# Patient Record
Sex: MALE | Race: WHITE | NOT HISPANIC OR LATINO | URBAN - METROPOLITAN AREA
[De-identification: names, ages, dates, MRNs, and addresses within clinical notes are randomized per-mention and may not be internally consistent; named-entity substitution may affect disease eponyms.]

---
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
## 2022-02-12 VITALS
WEIGHT: 194.2 LBS | WEIGHT: 193.4 LBS | TEMPERATURE: 98.6 F | HEART RATE: 65 BPM | DIASTOLIC BLOOD PRESSURE: 73 MMHG | TEMPERATURE: 99.2 F | BODY MASS INDEX: 27.19 KG/M2 | HEIGHT: 71 IN | SYSTOLIC BLOOD PRESSURE: 120 MMHG | DIASTOLIC BLOOD PRESSURE: 69 MMHG | HEART RATE: 69 BPM | SYSTOLIC BLOOD PRESSURE: 122 MMHG

## 2022-02-12 VITALS
HEIGHT: 74 IN | TEMPERATURE: 98.5 F | BODY MASS INDEX: 25.46 KG/M2 | HEART RATE: 66 BPM | DIASTOLIC BLOOD PRESSURE: 69 MMHG | WEIGHT: 198.4 LBS | SYSTOLIC BLOOD PRESSURE: 133 MMHG

## 2022-02-12 VITALS
SYSTOLIC BLOOD PRESSURE: 131 MMHG | WEIGHT: 207.6 LBS | HEART RATE: 76 BPM | DIASTOLIC BLOOD PRESSURE: 75 MMHG | TEMPERATURE: 98.6 F

## 2022-02-16 NOTE — PROGRESS NOTES
Patient:   NAPOLEON LEIGH            MRN: [id number]            FIN: [id number]               Age:   19 years     Sex:  Male     :  2000   Associated Diagnoses:   Sports physical   Author:   Sharad Hdz MD      Visit Information      Date of Service: 2019 02:54 pm  Performing Location: [facility]  Encounter#: [id number]      Primary Care Provider (PCP):  Sharad Hdz MD    NPI# [id number]   Visit type:  Annual exam.    Accompanied by   Source of history      Chief Complaint   2019 3:00 PM CST   Sports px     Adolescent Physical  SEE SCANNED PATIENT HISTORY FORM      Well Child History   Well Child History   Academics/ activities.     Socialization interacting well with family/ relatives and interacting well with peers/ friends.     Bathing daily baths.     Diet/ Feeding balanced.     Sleeping good sleeper.     No parental concerns/ questions.        Review of Systems   Constitutional:  Negative.    Eye:  No visual disturbances.    Ear/Nose/Mouth/Throat:  No decreased hearing.    Respiratory:  Negative.    Cardiovascular:  Negative.    Gastrointestinal:  Negative.    Genitourinary:  Negative.    Hematology/Lymphatics:  No bruising tendency, No bleeding tendency.    Endocrine:  Negative.    Musculoskeletal:  No joint pain, No muscle pain, No trauma.    Integumentary:  Negative.    Neurologic:  Alert and oriented X4, No abnormal balance, No headache.    Psychiatric:  No anxiety, No depression.       Health Status   Allergies:    Allergic Reactions (Selected)  Severity Not Documented  Augmentin (Rash)   Problem list:    All Problems  Anxiety / ICD-9-.00 / Confirmed  Eustachian Tube Dysfunction / ICD-9-.81 / Confirmed  Gastroesophageal Reflux / ICD-9-.81 / Confirmed  Inactive: Bronchitis / ICD-9-  Inactive: Conjunctivitis / ICD-9-.30  Inactive: Eczema / ICD-9-.9  Inactive: Herpangina  Inactive: Otitis / ICD-9-.9  Inactive: Pharyngitis  / ICD-9-  Inactive: Pneumonia / ICD-9-  Inactive: Poison Ivy / ICD-9-.6  Inactive: Rash, Skin / ICD-9-.1  Inactive: Recurrent Upper Respiratory Infection (URI) / ICD-9-.8  Inactive: Sinusitis  Inactive: Subconjunctival hemorrhage / ICD-9-.72   Medications:  (Selected)   ,    Medications          No Known Home Medications        Histories   Past Medical History:    Active  Gastroesophageal Reflux (530.81): Onset in the month of 9/2009 at 9 years  Anxiety (300.00): Onset in the month of 9/2009 at 9 years  Eustachian Tube Dysfunction (381.81): Onset in the month of 3/2006 at 5 years  Comments:  5/24/2011 CDT 10:33 AM CDT - Ioana Toribio.   Family History:    No family history items have been selected or recorded.   Procedure history:    None (SNOMED CT 122534620).   Social History:        Tobacco Assessment            Never smoker, Household tobacco concerns: No.  ,        Tobacco Assessment            Never smoker, Household tobacco concerns: No.        Physical Examination   Vital Signs   11/12/2019 3:00 PM CST Temperature Tympanic 98.5 DegF    Peripheral Pulse Rate 66 bpm    HR Method Electronic    Systolic Blood Pressure 133 mmHg    Diastolic Blood Pressure 69 mmHg    Mean Arterial Pressure 90 mmHg    BP Method Electronic      Measurements from flowsheet : Measurements   11/12/2019 3:00 PM CST Height Measured - Standard 73.5 in    Weight Measured - Standard 198.4 lb    BSA 2.16 m2    Body Mass Index 25.82 kg/m2    Body Mass Index Percentile 88.18      General:  Alert and oriented.    Eye:  Pupils are equal, round and reactive to light, Extraocular movements are intact, Normal conjunctiva.    HENT:  Tympanic membranes are clear, Normal hearing, Oral mucosa is moist.    Neck:  Supple, Non-tender, No lymphadenopathy, No thyromegaly.    Respiratory:  Lungs are clear to auscultation.    Cardiovascular:  Normal rate, Regular rhythm, No murmur, Good pulses equal in all  extremities.    Gastrointestinal:  Soft, Non-tender, Non-distended, No organomegaly.    Musculoskeletal:  No scoliosis, back and neck normal, Normal gait, normal hips, thighs,knees, legs, ankles and feet; normal duck walk, Upper and lower extremity strength normal and equal bilaterally, Normal shoulders, arms, elbow, forearms, wrists and hands.    Integumentary:  Intact.    Neurologic:  Alert, Oriented.    Psychiatric:  Cooperative, Appropriate mood & affect, Normal judgment.       Health Maintenance   14 - 17 years:   Risks/ Toxic exposure: smoking/ tobacco use, sexual activity, substance abuse (alcohol, drugs).   Counseling/ Guidance: nutrition balanced diet, exercise regular physical activity/ exercise, social behavior/ parenting (cognitive skills, discipline, peer relationships, puberty/ sex education, social, substance abuse education), injury prevention (auto/ airbags/ seat belts, helmet for biking/ skating/ ATV/ motorcycle).         Recommendations     Pending (in the next year)        Due            Well Child 2 yrs - 18 yrs due  11/12/19  and every 1  year(s)     Satisfied (in the past 1 year)        Satisfied            Body Mass Index Check (Male) on  11/12/19.          Impression and Plan   Diagnosis     Sports physical (INY66-HZ Z02.5).     Course:  Cleared for sports participation without restrictions.    Anticipatory Guidance:       Adolescence (11 - 21 years): Hobbies, Peer relations, School performance, Television, Substance abuse, Sexual identity/ dating, Seatbelts/ airbags, Depression/ anxiety, Alcohol/ drugs/ smoking, Nutrition/ oral health.    Counseled:  Patient, Family.

## 2022-02-16 NOTE — PROGRESS NOTES
Patient:   NAPOLEON LEIGH            MRN: [id number]            FIN: [id number]               Age:   17 years     Sex:  Male     :  2000   Associated Diagnoses:   Cold   Author:   Sharad Hdz MD      Visit Information      Primary Care Provider (PCP):  Sharad Hdz MD    NPI# [id number]      Chief Complaint   10/11/2017 2:01 PM CDT   Cough, congestion, chills x5 days.     Upper Respiratory Symptoms      History of Present Illness             The patient presents with symptoms of an upper respiratory infection.  The symptoms of the upper respiratory infection are described as rhinorrhea, sore throat, nasal congestion, cough and no sputum production.  The severity of the symptoms associated to the upper respiratory infection is moderate.  The symptoms of upper respiratory infection have lasted for 5 day(s).  Exacerbating factors consist of none.  Relieving factors consist of none.  Associated symptoms consist of none.        Review of Systems   Constitutional:  Fatigue.    Ear/Nose/Mouth/Throat:  Nasal congestion.    Respiratory:  Cough, No shortness of breath, No sputum production, No wheezing.    Integumentary:  No rash.             Health Status   Allergies:    Allergic Reactions (Selected)  Severity Not Documented  Augmentin (Rash)   Medications:  (Selected)   Prescriptions  Prescribed  Flonase 50 mcg/inh nasal spray: 2 spray(s), Nasal, Daily, # 1 EA, 6 Refill(s), Type: Maintenance, Pharmacy: [id number], 2 spray(s) nasal daily   Problem list:    All Problems  Anxiety / ICD-9-.00 / Confirmed  Eustachian Tube Dysfunction / ICD-9-.81 / Confirmed  Gastroesophageal Reflux / ICD-9-.81 / Confirmed  Inactive: Bronchitis / ICD-9-  Inactive: Conjunctivitis / ICD-9-.30  Inactive: Eczema / ICD-9-.9  Inactive: Herpangina  Inactive: Otitis / ICD-9-.9  Inactive: Pharyngitis / ICD-9-  Inactive: Pneumonia / ICD-9-  Inactive: Poison Ivy / ICD-9-CM  692.6  Inactive: Rash, Skin / ICD-9-.1  Inactive: Recurrent Upper Respiratory Infection (URI) / ICD-9-.8  Inactive: Sinusitis  Inactive: Subconjunctival hemorrhage / ICD-9-.72      Histories   Past Medical History:    Active  Gastroesophageal Reflux (530.81): Onset in the month of 9/2009 at 9 years  Anxiety (300.00): Onset in the month of 9/2009 at 9 years  Eustachian Tube Dysfunction (381.81): Onset in the month of 3/2006 at 5 years  Comments:  5/24/2011 CDT 10:33 AM CDT - Catarino , Ioana  Bilateral.   Family History:    No family history items have been selected or recorded.   Procedure history:    None (SNOMED CT 140846048).   Social History:        Tobacco Assessment            Never smoker, Household tobacco concerns: No.  ,        Tobacco Assessment            Never smoker, Household tobacco concerns: No.        Physical Examination   Vital Signs   10/11/2017 2:01 PM CDT Temperature Tympanic 99.2 DegF    Peripheral Pulse Rate 69 bpm    HR Method Electronic    Systolic Blood Pressure 122 mmHg    Diastolic Blood Pressure 73 mmHg    Mean Arterial Pressure 89 mmHg    BP Method Electronic      Measurements from flowsheet : Measurements   10/11/2017 2:01 PM CDT   Weight Measured - Standard                193.4 lb     General:  Alert and oriented, No acute distress.    Eye:  Normal conjunctiva.    HENT:  Normocephalic, Tympanic membranes are clear, Oral mucosa is moist.         Nose: Both nostrils, Discharge ( Small amount, Clear ), marked mucosal swelling questionable poylyp right.    Neck:  Supple, Non-tender, No lymphadenopathy.    Respiratory:  Lungs are clear to auscultation, Breath sounds are equal, Symmetrical chest wall expansion.         Respirations: Are within normal limits.         Pattern: Regular.         Breath sounds: Bilateral, Within normal limits.    Cardiovascular:  Normal rate, Regular rhythm, No murmur, Good pulses equal in all extremities, Normal peripheral perfusion, No edema.     Gastrointestinal:  Soft, Non-tender.    Integumentary:  Warm, No rash.    Neurologic:  Alert, Oriented.    Psychiatric:  Cooperative, Appropriate mood & affect.       Review / Management   Course:  Progressing as expected.       Impression and Plan   Diagnosis     Cold (BIF42-MI J00).     Course:  Progressing as expected.    Orders     Return to clinic or ER if no improvements or worsening signs symptoms.     Symptomatic care guidelines reviewed.     flonase for a few weeks fu 1 week if not better sooner if worse.     Counseled:  Regarding diagnosis (Reviewed the viral nature of this illness and recommended rest and fluids. Discussed the natural history of viral URI, anticipatory guidance).    Patient Instructions:  Launch follow-up (if licensed).

## 2022-02-16 NOTE — PROGRESS NOTES
Patient:   NAPOLEON LEGIH            MRN: [id number]            FIN: [id number]               Age:   17 years     Sex:  Male     :  2000   Associated Diagnoses:   Sports physical   Author:   Sharad Hdz MD      Visit Information   Visit type:  Annual exam.    Accompanied by   Source of history      Chief Complaint   2017 4:03 PM CST    Sports px.     Adolescent Physical  SEE SCANNED PATIENT HISTORY FORM      Well Child History   Well Child History   Academics/ activities.     Socialization interacting well with family/ relatives and interacting well with peers/ friends.     Bathing daily baths.     Diet/ Feeding balanced.     Sleeping good sleeper.     No parental concerns/ questions.        Review of Systems   Constitutional:  Negative.    Eye:  No visual disturbances.    Ear/Nose/Mouth/Throat:  No decreased hearing.    Respiratory:  Negative.    Cardiovascular:  Negative.    Gastrointestinal:  Negative.    Genitourinary:  Negative.    Hematology/Lymphatics:  No bruising tendency, No bleeding tendency.    Endocrine:  Negative.    Musculoskeletal:  No joint pain, No muscle pain, No trauma.    Integumentary:  Negative.    Neurologic:  Alert and oriented X4, No abnormal balance, No headache.    Psychiatric:  No anxiety, No depression.       Health Status   Allergies:    Allergic Reactions (Selected)  Severity Not Documented  Augmentin (Rash)   Problem list:    All Problems  Anxiety / ICD-9-.00 / Confirmed  Eustachian Tube Dysfunction / ICD-9-.81 / Confirmed  Gastroesophageal Reflux / ICD-9-.81 / Confirmed  Inactive: Bronchitis / ICD-9-  Inactive: Conjunctivitis / ICD-9-.30  Inactive: Eczema / ICD-9-.9  Inactive: Herpangina  Inactive: Otitis / ICD-9-.9  Inactive: Pharyngitis / ICD-9-  Inactive: Pneumonia / ICD-9-  Inactive: Poison Ivy / ICD-9-.6  Inactive: Rash, Skin / ICD-9-.1  Inactive: Recurrent Upper Respiratory Infection (URI) /  ICD-9-.8  Inactive: Sinusitis  Inactive: Subconjunctival hemorrhage / ICD-9-.72   Medications:  (Selected)   Prescriptions  Prescribed  Flonase 50 mcg/inh nasal spray: 2 spray(s), Nasal, Daily, # 1 EA, 6 Refill(s), Type: Maintenance, Pharmacy: [facility] #[id number], 2 spray(s) nasal daily      Histories   Past Medical History:    Active  Gastroesophageal Reflux (530.81): Onset in the month of 9/2009 at 9 years  Anxiety (300.00): Onset in the month of 9/2009 at 9 years  Eustachian Tube Dysfunction (381.81): Onset in the month of 3/2006 at 5 years  Comments:  5/24/2011 CDT 10:33 AM CDT - Ioana Toribio.   Family History:    No family history items have been selected or recorded.   Procedure history:    None (SNOMED CT 738161920).   Social History:        Tobacco Assessment            Never smoker, Household tobacco concerns: No.  ,        Tobacco Assessment            Never smoker, Household tobacco concerns: No.        Physical Examination   Vital Signs   11/8/2017 4:03 PM CST Temperature Tympanic 98.6 DegF    Peripheral Pulse Rate 65 bpm    HR Method Electronic    Systolic Blood Pressure 120 mmHg    Diastolic Blood Pressure 69 mmHg    Mean Arterial Pressure 86 mmHg    BP Method Electronic      Measurements from flowsheet : Measurements   11/8/2017 4:03 PM CST Height Measured - Standard 71 in    Weight Measured - Standard 194.2 lb    BSA 2.1 m2    Body Mass Index 27.08 kg/m2    Body Mass Index Percentile 95.08      General:  Alert and oriented.    Eye:  Pupils are equal, round and reactive to light, Extraocular movements are intact, Normal conjunctiva.    HENT:  Tympanic membranes are clear, Normal hearing, Oral mucosa is moist.    Neck:  Supple, Non-tender, No lymphadenopathy, No thyromegaly.    Respiratory:  Lungs are clear to auscultation.    Cardiovascular:  Normal rate, Regular rhythm, No murmur, Good pulses equal in all extremities.    Gastrointestinal:  Soft, Non-tender, Non-distended, No  organomegaly.    Musculoskeletal:  No scoliosis, back and neck normal, Normal gait, normal hips, thighs,knees, legs, ankles and feet; normal duck walk, Upper and lower extremity strength normal and equal bilaterally, Normal shoulders, arms, elbow, forearms, wrists and hands.    Integumentary:  Intact.    Neurologic:  Alert, Oriented.    Psychiatric:  Cooperative, Appropriate mood & affect, Normal judgment.       Health Maintenance   14 - 17 years:   Risks/ Toxic exposure: smoking/ tobacco use, sexual activity, substance abuse (alcohol, drugs).   Counseling/ Guidance: nutrition balanced diet, exercise regular physical activity/ exercise, social behavior/ parenting (cognitive skills, discipline, peer relationships, puberty/ sex education, social, substance abuse education), injury prevention (auto/ airbags/ seat belts, helmet for biking/ skating/ ATV/ motorcycle).         Impression and Plan   Diagnosis     Sports physical (EPO19-SH Z02.5).     Course:  Cleared for sports participation without restrictions.    Anticipatory Guidance:       Adolescence (11 - 21 years): Hobbies, Peer relations, School performance, Television, Substance abuse, Sexual identity/ dating, Seatbelts/ airbags, Depression/ anxiety, Alcohol/ drugs/ smoking, Nutrition/ oral health.    Counseled:  Patient, Family.

## 2022-02-16 NOTE — LETTER
(Inserted Image. Unable to display)     November 13, 2020      NAPOLEON LEIGH  [address]   [place], [place] [id number]          Dear NAPOLEON,      Thank you for selecting [facility] (previously [facility]) for your healthcare needs.    Our records indicate you are due for the following services:     Annual Physical.    (FYI   Regarding office visits: In some instances, a video visit or telephone visit may be offered as an option.)      To schedule an appointment or if you have further questions, please contact your primary clinic:   [facility]       [phone]   [facility]       [phone]              [facility]     [phone]      Powered by [employer]    Sincerely,    Sharad Hdz MD

## 2022-02-16 NOTE — NURSING NOTE
Comprehensive Intake Entered On:  11/12/2019 3:02 PM CST    Performed On:  11/12/2019 3:00 PM CST by Corazon Gurrola               Summary   Chief Complaint :   Sports px   Menstrual Status :   N/A   Weight Measured :   198.4 lb(Converted to: 198 lb 6 oz, 89.99 kg)    Height Measured :   73.5 in(Converted to: 6 ft 1 in, 186.69 cm)    Body Mass Index :   25.82 kg/m2   Body Surface Area :   2.16 m2   Systolic Blood Pressure :   133 mmHg   Diastolic Blood Pressure :   69 mmHg   Mean Arterial Pressure :   90 mmHg   Peripheral Pulse Rate :   66 bpm   BP Method :   Electronic   HR Method :   Electronic   Temperature Tympanic :   98.5 DegF(Converted to: 36.9 DegC)    Corazon Gurrola - 11/12/2019 3:00 PM CST   Health Status   Allergies Verified? :   Yes   Medication History Verified? :   Yes   Pre-Visit Planning Status :   Completed   Tobacco Use? :   Never smoker   Corazon Gurrola - 11/12/2019 3:00 PM CST   Meds / Allergies   (As Of: 11/12/2019 3:02:43 PM CST)   Allergies (Active)   Augmentin  Estimated Onset Date:   Unspecified ; Reactions:   rash ; Created By:   Deborah Alexandra; Reaction Status:   Active ; Category:   Drug ; Substance:   Augmentin ; Type:   Allergy ; Updated By:   Deborah Alexandra; Reviewed Date:   11/5/2014 7:25 PM CST        Medication List   (As Of: 11/12/2019 3:02:43 PM CST)   No Known Home Medications     Corazon Gurrola - 11/12/2019 3:00:49 PM      Prescription/Discharge Order    fluticasone nasal  :   fluticasone nasal ; Status:   Completed ; Ordered As Mnemonic:   Flonase 50 mcg/inh nasal spray ; Simple Display Line:   2 spray(s), Nasal, Daily, 1 EA, 6 Refill(s) ; Ordering Provider:   Sharad Hdz MD; Catalog Code:   fluticasone nasal ; Order Dt/Tm:   6/4/2019 6:31:08 PM CDT            Vision Testing POC   Corrective Lenses :   None   Eye, Left Visual Acuity :   20/15   Eye, Right Visual Acuity :   20/15   Eye, Bilateral Visual Acuity :   20/15   Corazon Gurrola  - 11/12/2019 3:03 PM CST

## 2022-02-16 NOTE — NURSING NOTE
Comprehensive Intake Entered On:  6/4/2019 6:19 PM CDT    Performed On:  6/4/2019 6:17 PM CDT by Corazon Gurrola               Summary   Chief Complaint :   Nasal drainage, congestion, sore throat.  Started yesterday.    Menstrual Status :   N/A   Weight Measured :   207.6 lb(Converted to: 207 lb 10 oz, 94.17 kg)    Systolic Blood Pressure :   131 mmHg   Diastolic Blood Pressure :   75 mmHg   Mean Arterial Pressure :   94 mmHg   Peripheral Pulse Rate :   76 bpm   BP Method :   Electronic   HR Method :   Electronic   Temperature Tympanic :   98.6 DegF(Converted to: 37.0 DegC)    Corazon Gurrola - 6/4/2019 6:17 PM CDT   Health Status   Allergies Verified? :   Yes   Medication History Verified? :   Yes   Tobacco Use? :   Never smoker   Corazon Gurrola - 6/4/2019 6:17 PM CDT   Meds / Allergies   (As Of: 6/4/2019 6:19:25 PM CDT)   Allergies (Active)   Augmentin  Estimated Onset Date:   Unspecified ; Reactions:   rash ; Created By:   Deborah Alexandra; Reaction Status:   Active ; Category:   Drug ; Substance:   Augmentin ; Type:   Allergy ; Updated By:   Deborah Alexandra; Reviewed Date:   11/5/2014 7:25 PM CST        Medication List   (As Of: 6/4/2019 6:19:25 PM CDT)   Prescription/Discharge Order    fluticasone nasal  :   fluticasone nasal ; Status:   Processing ; Ordered As Mnemonic:   Flonase 50 mcg/inh nasal spray ; Ordering Provider:   Sharad Hdz MD; Action Display:   Complete ; Catalog Code:   fluticasone nasal ; Order Dt/Tm:   6/4/2019 6:18:19 PM

## 2022-02-16 NOTE — PROGRESS NOTES
Patient:   NAPOLEON LEIGH            MRN: [id number]            FIN: [id number]               Age:   18 years     Sex:  Male     :  2000   Associated Diagnoses:   None   Author:   Sharad Hdz MD      Chief Complaint   2019 6:17 PM CDT     Nasal drainage, congestion, sore throat.  Started yesterday.        History of Present Illness             The patient presents with facial pain.  The duration is 21  days.  There were exacerbating factors.  The symptom occurs constantly.  The course is worsening.               The patient presents with nasal congestion.  The location is both sides.     It is described as a moderate amount and green in color.        Review of Systems   Constitutional:  No fever, No chills, No sweats.    Eye:  Negative.    Ear/Nose/Mouth/Throat:  Nasal congestion, Sinus pain, Sore throat.         Nasal discharge: Large amount, Green, Yellow.         Nasal obstruction: Bilateral.    Respiratory:  Cough.       Health Status   Allergies:    Allergic Reactions (Selected)  Severity Not Documented  Augmentin (Rash)   Medications:  (Selected)   Prescriptions  Prescribed  Ceftin 250 mg oral tablet: = 1 tab(s) ( 250 mg ), PO, BID, x 10 day(s), # 20 tab(s), 0 Refill(s), Type: Acute, Pharmacy: [id number], 1 tab(s) Oral bid,x10 day(s)  Flonase 50 mcg/inh nasal spray: = 2 spray(s), Nasal, Daily, # 1 EA, 6 Refill(s), Type: Maintenance, Pharmacy: [id number], 2 spray(s) Nasal daily   Problem list:    All Problems  Anxiety / ICD-9-.00 / Confirmed  Eustachian Tube Dysfunction / ICD-9-.81 / Confirmed  Gastroesophageal Reflux / ICD-9-.81 / Confirmed  Inactive: Bronchitis / ICD-9-  Inactive: Conjunctivitis / ICD-9-.30  Inactive: Eczema / ICD-9-.9  Inactive: Herpangina  Inactive: Otitis / ICD-9-.9  Inactive: Pharyngitis / ICD-9-  Inactive: Pneumonia / ICD-9-  Inactive: Poison Ivy / ICD-9-.6  Inactive: Rash, Skin /  ICD-9-.1  Inactive: Recurrent Upper Respiratory Infection (URI) / ICD-9-.8  Inactive: Sinusitis  Inactive: Subconjunctival hemorrhage / ICD-9-.72      Histories   Past Medical History:    Active  Gastroesophageal Reflux (530.81): Onset in the month of 9/2009 at 9 years  Anxiety (300.00): Onset in the month of 9/2009 at 9 years  Eustachian Tube Dysfunction (381.81): Onset in the month of 3/2006 at 5 years  Comments:  5/24/2011 CDT 10:33 AM CDT - Catarino , Ioana  Bilateral.   Family History:    No family history items have been selected or recorded.   Procedure history:    None (SNOMED CT 676741780).   Social History:        Tobacco Assessment            Never smoker, Household tobacco concerns: No.,        Tobacco Assessment            Never smoker, Household tobacco concerns: No.      Physical Examination   Vital Signs   6/4/2019 6:17 PM CDT Temperature Tympanic 98.6 DegF    Peripheral Pulse Rate 76 bpm    HR Method Electronic    Systolic Blood Pressure 131 mmHg    Diastolic Blood Pressure 75 mmHg    Mean Arterial Pressure 94 mmHg    BP Method Electronic      Measurements from flowsheet : Measurements   6/4/2019 6:17 PM CDT     Weight Measured - Standard                207.6 lb     General:  Alert and oriented, No acute distress.    Eye:  Normal conjunctiva.    HENT:  Normocephalic, Tympanic membranes are clear.         Ear: Both ears, Within normal limits.         Nose: Both nostrils, Patent, Discharge ( Moderate amount, Green ), Turbinates ( Boggy, Erythematous ).         Throat: Tonsils ( Erythematous ), Pharynx ( Erythematous ).    Neck:  Supple, Non-tender, No lymphadenopathy.    Respiratory:  Lungs are clear to auscultation.       Impression and Plan   Diagnosis   Course:  Worsening.    Orders     Orders (Selected)   Outpatient Orders  Ordered  Return to Clinic (Request): RFV: Hep A #2 in 6-12 months.   HPV #2 in 2 months.   HPV #3 in 6-12 months  Prescriptions  Prescribed  Ceftin 250 mg oral  tablet: = 1 tab(s) ( 250 mg ), PO, BID, x 10 day(s), # 20 tab(s), 0 Refill(s), Type: Acute, Pharmacy: [id number], 1 tab(s) Oral bid,x10 day(s)  Flonase 50 mcg/inh nasal spray: = 2 spray(s), Nasal, Daily, # 1 EA, 6 Refill(s), Type: Maintenance, Pharmacy: [id number], 2 spray(s) Nasal daily.     Plan:       Follow-up: With Primary Care Provider, As needed or sooner if symptoms worsen.    Counseled:  Patient, Regarding diagnosis, Regarding treatment, Regarding medications.    Patient Instructions:  Launch follow-up (if licensed).